# Patient Record
Sex: MALE | Race: WHITE | NOT HISPANIC OR LATINO | Employment: FULL TIME | URBAN - METROPOLITAN AREA
[De-identification: names, ages, dates, MRNs, and addresses within clinical notes are randomized per-mention and may not be internally consistent; named-entity substitution may affect disease eponyms.]

---

## 2023-03-30 ENCOUNTER — TELEPHONE (OUTPATIENT)
Dept: PSYCHIATRY | Facility: CLINIC | Age: 23
End: 2023-03-30
Payer: COMMERCIAL

## 2023-03-30 NOTE — TELEPHONE ENCOUNTER
----- Message from Isabella Elliott sent at 3/29/2023 12:34 PM CDT -----  Type:  Appointment Request    Name of Caller:GISSELLE BRYANT [[id number]]  When is the first available appointment?No access  Symptoms:medications   Would the patient rather a call back or a response via [facility]? Call back   Best Call Back Number:[phone]  Additional Information: Patient indicates he would like to schedule an appointment with the provider. Patient indicates he would like to discuss his diagnosis and medications with the provider. Patient indicates he would like to be scheduled for the soonest appointment available if possible. Patient indicates he would like a call back with further assistance in scheduling and more information.

## 2023-04-11 ENCOUNTER — TELEPHONE (OUTPATIENT)
Dept: PSYCHIATRY | Facility: CLINIC | Age: 23
End: 2023-04-11

## 2023-04-11 NOTE — TELEPHONE ENCOUNTER
----- Message from Jacquelin Rehman sent at 2023  9:38 AM CDT -----  Contact: PATIENT  Allen Stewart  MRN: [id number]  : 2000  PCP: No primary care provider on file.  Home Phone      [phone]  Work Phone      Not on file.  Mobile          [phone]      MESSAGE: Patient would like to make an appointment for the treatment of anger, depression & anxiety.        Phone: [phone]

## 2023-04-11 NOTE — TELEPHONE ENCOUNTER
Patient states this is not his name, this is his father's name with his . Patient name is Blayne. Looking him up and he already has a chart. Sent a email to chart corrections to delete this account.

## 2024-02-23 ENCOUNTER — OCCUPATIONAL HEALTH (OUTPATIENT)
Dept: URGENT CARE | Facility: CLINIC | Age: 24
End: 2024-02-23

## 2024-02-23 DIAGNOSIS — Z00.00 ENCOUNTER FOR PHYSICAL EXAMINATION: Primary | ICD-10-CM

## 2024-02-23 PROCEDURE — 99002 DEVICE HANDLING PHYS/QHP: CPT | Mod: S$GLB,,, | Performed by: SURGERY

## 2024-02-23 PROCEDURE — 99080 SPECIAL REPORTS OR FORMS: CPT | Mod: S$GLB,,, | Performed by: SURGERY

## 2024-02-23 PROCEDURE — 94010 BREATHING CAPACITY TEST: CPT | Mod: S$GLB,,, | Performed by: SURGERY
